# Patient Record
Sex: FEMALE | Race: BLACK OR AFRICAN AMERICAN | URBAN - METROPOLITAN AREA
[De-identification: names, ages, dates, MRNs, and addresses within clinical notes are randomized per-mention and may not be internally consistent; named-entity substitution may affect disease eponyms.]

---

## 2022-01-31 ENCOUNTER — OFFICE VISIT (OUTPATIENT)
Dept: FAMILY MEDICINE | Facility: CLINIC | Age: 42
End: 2022-01-31
Payer: COMMERCIAL

## 2022-01-31 VITALS
WEIGHT: 125.7 LBS | RESPIRATION RATE: 18 BRPM | HEIGHT: 63 IN | BODY MASS INDEX: 22.27 KG/M2 | SYSTOLIC BLOOD PRESSURE: 88 MMHG | HEART RATE: 62 BPM | DIASTOLIC BLOOD PRESSURE: 56 MMHG | TEMPERATURE: 97.7 F | OXYGEN SATURATION: 100 %

## 2022-01-31 DIAGNOSIS — Z02.89 HISTORY AND PHYSICAL EXAMINATION, IMMIGRATION: Primary | ICD-10-CM

## 2022-01-31 LAB — T PALLIDUM AB SER QL: NONREACTIVE

## 2022-01-31 PROCEDURE — 86762 RUBELLA ANTIBODY: CPT | Performed by: INTERNAL MEDICINE

## 2022-01-31 PROCEDURE — 86780 TREPONEMA PALLIDUM: CPT | Performed by: INTERNAL MEDICINE

## 2022-01-31 PROCEDURE — 86735 MUMPS ANTIBODY: CPT | Performed by: INTERNAL MEDICINE

## 2022-01-31 PROCEDURE — 86765 RUBEOLA ANTIBODY: CPT | Performed by: INTERNAL MEDICINE

## 2022-01-31 PROCEDURE — 86481 TB AG RESPONSE T-CELL SUSP: CPT | Performed by: INTERNAL MEDICINE

## 2022-01-31 PROCEDURE — 90715 TDAP VACCINE 7 YRS/> IM: CPT | Performed by: INTERNAL MEDICINE

## 2022-01-31 PROCEDURE — 87591 N.GONORRHOEAE DNA AMP PROB: CPT | Performed by: INTERNAL MEDICINE

## 2022-01-31 PROCEDURE — 36415 COLL VENOUS BLD VENIPUNCTURE: CPT | Performed by: INTERNAL MEDICINE

## 2022-01-31 PROCEDURE — 90471 IMMUNIZATION ADMIN: CPT | Performed by: INTERNAL MEDICINE

## 2022-01-31 PROCEDURE — 99386 PREV VISIT NEW AGE 40-64: CPT | Mod: 25 | Performed by: INTERNAL MEDICINE

## 2022-01-31 ASSESSMENT — MIFFLIN-ST. JEOR: SCORE: 1197.47

## 2022-01-31 ASSESSMENT — PAIN SCALES - GENERAL: PAINLEVEL: NO PAIN (0)

## 2022-01-31 NOTE — PROGRESS NOTES
"  Assessment & Plan     History and physical examination, immigration  Here for immigration physical  She has no history of any other medical issues  Does not take any alcohol  Does not abuse any drugs  Does not smoke  No mental health issues now or ever in the past  She is due for a Tdap  Up-to-date with flu vaccine and Covid vaccines  We will check MMR immunity  She had a history of being positive for Mantoux  Will obtain a QuantiFERON test and if that is positive she will get a chest x-ray  - Treponema Abs w Reflex to RPR and Titer; Future  - Neisseria gonorrhoeae PCR; Future  - Quantiferon TB Gold Plus; Future  - TDAP VACCINE (Adacel, Boostrix)  [[id number]]  - Rubella Antibody IgG; Future  - Rubeola Antibody IgG; Future  - Mumps Immune Status, IgG  - Rubeola Antibody IgG  - Rubella Antibody IgG  - Quantiferon TB Gold Plus  - Neisseria gonorrhoeae PCR  - Treponema Abs w Reflex to RPR and Titer  - [place] IMMIGRATION PHYSICAL      30 minutes spent on the date of the encounter doing chart review, history and exam, documentation and further activities per the note           Return in about 4 weeks (around 2/28/2022), or if symptoms worsen or fail to improve.    Giovany Moreno MD  [facility]    Pilo Borges is a 41 year old who presents for the following health issues     HPI   Here for immigration physical  No history of any mental health issues now or ever in the past  She has no other medical issues  Does not smoke  Does not take alcohol  Does not do any drugs  Her blood pressure is always on the lower side  She is not symptomatic from this low blood pressure          Review of Systems   Constitutional, HEENT, cardiovascular, pulmonary, gi and gu systems are negative, except as otherwise noted.      Objective    BP (!) 88/56   Pulse 62   Temp 97.7  F (36.5  C) (Oral)   Resp 18   Ht 1.605 m (5' 3.2\")   Wt 57 kg (125 lb 11.2 oz)   SpO2 100%   BMI 22.13 kg/m    Body mass index is 22.13 " kg/m .  Physical Exam   GENERAL: healthy, alert and no distress  EYES: Eyes grossly normal to inspection, PERRL and conjunctivae and sclerae normal  HENT: ear canals and TM's normal, nose and mouth without ulcers or lesions  NECK: no adenopathy, no asymmetry, masses, or scars and thyroid normal to palpation  RESP: lungs clear to auscultation - no rales, rhonchi or wheezes  CV: regular rate and rhythm, normal S1 S2, no S3 or S4, no murmur, click or rub, no peripheral edema and peripheral pulses strong  ABDOMEN: soft, nontender, no hepatosplenomegaly, no masses and bowel sounds normal  MS: no gross musculoskeletal defects noted, no edema  SKIN: no suspicious lesions or rashes  NEURO: Normal strength and tone, mentation intact and speech normal  PSYCH: mentation appears normal, affect normal/bright

## 2022-01-31 NOTE — PATIENT INSTRUCTIONS
Patient Education     Adacel Tdap Injection   Uses  Vaccine to prevent a bacterial infection.  Instructions  This medicine is given as an injection into a muscle.  Please tell your doctor and pharmacist about all the medicines you take. Include both prescription and over-the-counter medicines. Also tell them about any vitamins, herbal medicines, or anything else you take for your health.  Your doctor may prescribe other medications to reduce side effects. Follow instructions carefully.  Cautions  Tell your doctor and pharmacist if you ever had an allergic reaction to a medicine. Symptoms of an allergic reaction can include trouble breathing, skin rash, itching, swelling, or severe dizziness.  Speak with your health care provider before receiving any vaccinations.  Tell the doctor or pharmacist if you are pregnant, planning to be pregnant, or breastfeeding.  Ask your pharmacist if this medicine can interact with any of your other medicines. Be sure to tell them about all the medicines you take.  Side Effects  The following is a list of some common side effects from this medicine. Please speak with your doctor about what you should do if you experience these or other side effects.    diarrhea    headaches    reaction at the area of the injection (pain, redness, swelling)    pain in the joints    muscle pain    nausea    pain near injection site  Call your doctor or get medical help right away if you notice any of these more serious side effects:    dizziness    fainting    high fever    muscle weakness    feeling of numbness or tingling in your hands and feet    seizures    shortness of breath    blurring or changes of vision  A few people may have an allergic reactions to this medicine. Symptoms can include difficulty breathing, skin rash, itching, swelling, or severe dizziness. If you notice any of these symptoms, seek medical help quickly.  Extra  Please speak with your doctor, nurse, or pharmacist if you have  any questions about this medicine.  [email or web address]  IMPORTANT NOTE: This document tells you briefly how to take your medicine, but it does not tell you all there is to know about it.Your doctor or pharmacist may give you other documents about your medicine. Please talk to them if you have any questions.Always follow their advice. There is a more complete description of this medicine available in English.Scan this code on your smartphone or tablet or use the web address below. You can also ask your pharmacist for a printout. If you have any questions, please ask your pharmacist.     2021 [employer].

## 2022-02-01 LAB
GAMMA INTERFERON BACKGROUND BLD IA-ACNC: 3.14 IU/ML
M TB IFN-G BLD-IMP: POSITIVE
M TB IFN-G CD4+ BCKGRND COR BLD-ACNC: 5.7 IU/ML
MEV IGG SER IA-ACNC: >300 AU/ML
MEV IGG SER IA-ACNC: POSITIVE
MITOGEN IGNF BCKGRD COR BLD-ACNC: 6.86 IU/ML
MITOGEN IGNF BCKGRD COR BLD-ACNC: 6.86 IU/ML
MUMPS ANTIBODY IGG INSTRUMENT VALUE: 18.8 AU/ML
MUV IGG SER QL IA: POSITIVE
N GONORRHOEA DNA SPEC QL NAA+PROBE: NEGATIVE
QUANTIFERON MITOGEN: 8.84 IU/ML
QUANTIFERON NIL TUBE: 3.14 IU/ML
QUANTIFERON TB1 TUBE: 10 IU/ML
QUANTIFERON TB2 TUBE: 10
RUBV IGG SERPL QL IA: 25.8 INDEX
RUBV IGG SERPL QL IA: POSITIVE

## 2022-02-02 ENCOUNTER — ANCILLARY PROCEDURE (OUTPATIENT)
Dept: GENERAL RADIOLOGY | Facility: CLINIC | Age: 42
End: 2022-02-02
Attending: INTERNAL MEDICINE
Payer: COMMERCIAL

## 2022-02-02 DIAGNOSIS — Z02.89 HISTORY AND PHYSICAL EXAMINATION, IMMIGRATION: ICD-10-CM

## 2022-02-02 PROCEDURE — 71046 X-RAY EXAM CHEST 2 VIEWS: CPT | Performed by: RADIOLOGY

## 2022-03-12 ENCOUNTER — HEALTH MAINTENANCE LETTER (OUTPATIENT)
Age: 42
End: 2022-03-12

## 2023-04-23 ENCOUNTER — HEALTH MAINTENANCE LETTER (OUTPATIENT)
Age: 43
End: 2023-04-23

## 2023-09-23 ENCOUNTER — HEALTH MAINTENANCE LETTER (OUTPATIENT)
Age: 43
End: 2023-09-23

## 2024-06-29 ENCOUNTER — HEALTH MAINTENANCE LETTER (OUTPATIENT)
Age: 44
End: 2024-06-29